# Patient Record
Sex: MALE | Race: WHITE
[De-identification: names, ages, dates, MRNs, and addresses within clinical notes are randomized per-mention and may not be internally consistent; named-entity substitution may affect disease eponyms.]

---

## 2020-03-22 ENCOUNTER — HOSPITAL ENCOUNTER (EMERGENCY)
Dept: HOSPITAL 94 - ER | Age: 30
Discharge: HOME | End: 2020-03-22
Payer: MEDICAID

## 2020-03-22 VITALS — DIASTOLIC BLOOD PRESSURE: 92 MMHG | SYSTOLIC BLOOD PRESSURE: 131 MMHG

## 2020-03-22 VITALS — BODY MASS INDEX: 20.31 KG/M2 | HEIGHT: 72 IN | WEIGHT: 149.91 LBS

## 2020-03-22 DIAGNOSIS — F17.200: ICD-10-CM

## 2020-03-22 DIAGNOSIS — F12.90: ICD-10-CM

## 2020-03-22 DIAGNOSIS — L03.116: Primary | ICD-10-CM

## 2020-03-22 DIAGNOSIS — F15.90: ICD-10-CM

## 2020-03-22 DIAGNOSIS — Z87.442: ICD-10-CM

## 2020-03-22 PROCEDURE — 99283 EMERGENCY DEPT VISIT LOW MDM: CPT

## 2020-04-20 ENCOUNTER — HOSPITAL ENCOUNTER (EMERGENCY)
Dept: HOSPITAL 94 - ER | Age: 30
Discharge: HOME | End: 2020-04-20
Payer: MEDICAID

## 2020-04-20 VITALS — BODY MASS INDEX: 19.92 KG/M2 | WEIGHT: 150.31 LBS | HEIGHT: 73 IN

## 2020-04-20 VITALS — DIASTOLIC BLOOD PRESSURE: 87 MMHG | SYSTOLIC BLOOD PRESSURE: 132 MMHG

## 2020-04-20 DIAGNOSIS — F12.90: ICD-10-CM

## 2020-04-20 DIAGNOSIS — K04.7: Primary | ICD-10-CM

## 2020-04-20 DIAGNOSIS — Z79.899: ICD-10-CM

## 2020-04-20 DIAGNOSIS — F17.200: ICD-10-CM

## 2020-04-20 DIAGNOSIS — F15.90: ICD-10-CM

## 2020-04-20 DIAGNOSIS — Z87.442: ICD-10-CM

## 2020-04-20 PROCEDURE — 99283 EMERGENCY DEPT VISIT LOW MDM: CPT

## 2020-06-08 ENCOUNTER — HOSPITAL ENCOUNTER (EMERGENCY)
Dept: HOSPITAL 94 - ER | Age: 30
Discharge: HOME | End: 2020-06-08
Payer: MEDICAID

## 2020-06-08 VITALS — HEIGHT: 72 IN | WEIGHT: 143.3 LBS | BODY MASS INDEX: 19.41 KG/M2

## 2020-06-08 VITALS — SYSTOLIC BLOOD PRESSURE: 134 MMHG | DIASTOLIC BLOOD PRESSURE: 88 MMHG

## 2020-06-08 DIAGNOSIS — F15.90: ICD-10-CM

## 2020-06-08 DIAGNOSIS — F12.90: ICD-10-CM
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
## 2020-06-08 DIAGNOSIS — F17.200: ICD-10-CM

## 2020-06-08 DIAGNOSIS — Z20.2: ICD-10-CM
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
## 2020-06-08 DIAGNOSIS — S30.812A: Primary | ICD-10-CM
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
## 2020-06-08 DIAGNOSIS — Z79.899: ICD-10-CM

## 2020-06-08 PROCEDURE — 87491 CHLMYD TRACH DNA AMP PROBE: CPT

## 2020-06-08 PROCEDURE — 86592 SYPHILIS TEST NON-TREP QUAL: CPT

## 2020-06-08 PROCEDURE — 99283 EMERGENCY DEPT VISIT LOW MDM: CPT

## 2020-06-08 PROCEDURE — 36415 COLL VENOUS BLD VENIPUNCTURE: CPT

## 2020-06-08 PROCEDURE — 87591 N.GONORRHOEAE DNA AMP PROB: CPT

## 2020-06-08 PROCEDURE — 96372 THER/PROPH/DIAG INJ SC/IM: CPT

## 2020-06-16 NOTE — NUR
letter being sent.  pt. has syphilis and was not given penicillin.  pt. needs 
to return to the ER and receive pcn for this disease.

## 2020-07-06 ENCOUNTER — HOSPITAL ENCOUNTER (EMERGENCY)
Dept: HOSPITAL 94 - ER | Age: 30
Discharge: HOME | End: 2020-07-06
Payer: MEDICAID

## 2020-07-06 VITALS — SYSTOLIC BLOOD PRESSURE: 114 MMHG | DIASTOLIC BLOOD PRESSURE: 78 MMHG

## 2020-07-06 VITALS — WEIGHT: 130.51 LBS | HEIGHT: 71 IN | BODY MASS INDEX: 18.27 KG/M2

## 2020-07-06 DIAGNOSIS — Z79.899: ICD-10-CM

## 2020-07-06 DIAGNOSIS — R10.9: ICD-10-CM

## 2020-07-06 DIAGNOSIS — R51: ICD-10-CM

## 2020-07-06 DIAGNOSIS — Z87.442: ICD-10-CM
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
## 2020-07-06 DIAGNOSIS — F15.90: ICD-10-CM

## 2020-07-06 DIAGNOSIS — S22.32XA: Primary | ICD-10-CM

## 2020-07-06 DIAGNOSIS — R05: ICD-10-CM
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
## 2020-07-06 DIAGNOSIS — F12.90: ICD-10-CM

## 2020-07-06 DIAGNOSIS — H53.8: ICD-10-CM

## 2020-07-06 PROCEDURE — 99285 EMERGENCY DEPT VISIT HI MDM: CPT

## 2020-07-06 PROCEDURE — 70486 CT MAXILLOFACIAL W/O DYE: CPT

## 2020-07-06 PROCEDURE — 72125 CT NECK SPINE W/O DYE: CPT

## 2020-07-06 PROCEDURE — 90471 IMMUNIZATION ADMIN: CPT

## 2020-07-06 PROCEDURE — 71250 CT THORAX DX C-: CPT

## 2020-07-06 PROCEDURE — 70450 CT HEAD/BRAIN W/O DYE: CPT

## 2020-07-06 PROCEDURE — 90715 TDAP VACCINE 7 YRS/> IM: CPT

## 2020-07-14 ENCOUNTER — HOSPITAL ENCOUNTER (EMERGENCY)
Dept: HOSPITAL 94 - ER | Age: 30
Discharge: HOME | End: 2020-07-14
Payer: MEDICAID

## 2020-07-14 VITALS — BODY MASS INDEX: 19.17 KG/M2 | HEIGHT: 71 IN | WEIGHT: 136.91 LBS

## 2020-07-14 VITALS — DIASTOLIC BLOOD PRESSURE: 93 MMHG | SYSTOLIC BLOOD PRESSURE: 127 MMHG

## 2020-07-14 DIAGNOSIS — Z87.442: ICD-10-CM

## 2020-07-14 DIAGNOSIS — Z79.2: ICD-10-CM

## 2020-07-14 DIAGNOSIS — Z79.899: ICD-10-CM

## 2020-07-14 DIAGNOSIS — M79.89: ICD-10-CM

## 2020-07-14 DIAGNOSIS — F12.90: ICD-10-CM

## 2020-07-14 DIAGNOSIS — F15.90: ICD-10-CM

## 2020-07-14 DIAGNOSIS — M79.631: Primary | ICD-10-CM

## 2020-07-14 PROCEDURE — 96372 THER/PROPH/DIAG INJ SC/IM: CPT

## 2020-07-14 PROCEDURE — 99284 EMERGENCY DEPT VISIT MOD MDM: CPT

## 2020-09-12 ENCOUNTER — HOSPITAL ENCOUNTER (EMERGENCY)
Dept: HOSPITAL 94 - ER | Age: 30
Discharge: HOME | End: 2020-09-12
Payer: MEDICAID

## 2020-09-12 VITALS — BODY MASS INDEX: 19.41 KG/M2 | HEIGHT: 72 IN | WEIGHT: 143.3 LBS

## 2020-09-12 VITALS — DIASTOLIC BLOOD PRESSURE: 84 MMHG | SYSTOLIC BLOOD PRESSURE: 122 MMHG

## 2020-09-12 DIAGNOSIS — M79.644: ICD-10-CM

## 2020-09-12 DIAGNOSIS — F17.200: ICD-10-CM

## 2020-09-12 DIAGNOSIS — Z87.442: ICD-10-CM

## 2020-09-12 DIAGNOSIS — F15.90: ICD-10-CM
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
## 2020-09-12 DIAGNOSIS — M79.89: ICD-10-CM

## 2020-09-12 DIAGNOSIS — Z79.2: ICD-10-CM

## 2020-09-12 DIAGNOSIS — F12.90: ICD-10-CM
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
## 2020-09-12 DIAGNOSIS — S60.111A: Primary | ICD-10-CM
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
## 2020-09-12 DIAGNOSIS — Z79.899: ICD-10-CM

## 2020-09-12 DIAGNOSIS — R58: ICD-10-CM

## 2020-09-12 PROCEDURE — 96372 THER/PROPH/DIAG INJ SC/IM: CPT

## 2020-09-12 PROCEDURE — 99284 EMERGENCY DEPT VISIT MOD MDM: CPT

## 2021-02-11 ENCOUNTER — HOSPITAL ENCOUNTER (EMERGENCY)
Dept: HOSPITAL 94 - ER | Age: 31
Discharge: HOME | End: 2021-02-11
Payer: MEDICAID

## 2021-02-11 VITALS — BODY MASS INDEX: 21.04 KG/M2 | WEIGHT: 150.31 LBS | HEIGHT: 71 IN

## 2021-02-11 VITALS — DIASTOLIC BLOOD PRESSURE: 78 MMHG | SYSTOLIC BLOOD PRESSURE: 126 MMHG

## 2021-02-11 DIAGNOSIS — M25.552: Primary | ICD-10-CM

## 2021-02-11 DIAGNOSIS — Z87.442: ICD-10-CM

## 2021-02-11 DIAGNOSIS — Z79.2: ICD-10-CM

## 2021-02-11 DIAGNOSIS — F15.90: ICD-10-CM

## 2021-02-11 DIAGNOSIS — Z79.899: ICD-10-CM

## 2021-02-11 DIAGNOSIS — F12.90: ICD-10-CM

## 2021-02-11 PROCEDURE — 73502 X-RAY EXAM HIP UNI 2-3 VIEWS: CPT

## 2021-02-11 PROCEDURE — 99283 EMERGENCY DEPT VISIT LOW MDM: CPT

## 2021-02-14 ENCOUNTER — HOSPITAL ENCOUNTER (EMERGENCY)
Dept: HOSPITAL 94 - ER | Age: 31
Discharge: HOME | End: 2021-02-14
Payer: MEDICAID

## 2021-02-14 VITALS — WEIGHT: 140.3 LBS | HEIGHT: 71 IN | BODY MASS INDEX: 19.64 KG/M2

## 2021-02-14 VITALS — DIASTOLIC BLOOD PRESSURE: 68 MMHG | SYSTOLIC BLOOD PRESSURE: 119 MMHG

## 2021-02-14 DIAGNOSIS — Z79.2: ICD-10-CM

## 2021-02-14 DIAGNOSIS — Z79.899: ICD-10-CM

## 2021-02-14 DIAGNOSIS — Z87.440: ICD-10-CM

## 2021-02-14 DIAGNOSIS — F12.90: ICD-10-CM

## 2021-02-14 DIAGNOSIS — F15.90: ICD-10-CM

## 2021-02-14 DIAGNOSIS — L02.31: Primary | ICD-10-CM

## 2021-02-14 PROCEDURE — 10061 I&D ABSCESS COMP/MULTIPLE: CPT

## 2021-02-14 PROCEDURE — 90715 TDAP VACCINE 7 YRS/> IM: CPT

## 2021-02-14 PROCEDURE — 99283 EMERGENCY DEPT VISIT LOW MDM: CPT

## 2021-02-14 PROCEDURE — 93005 ELECTROCARDIOGRAM TRACING: CPT

## 2021-07-12 ENCOUNTER — HOSPITAL ENCOUNTER (EMERGENCY)
Dept: HOSPITAL 94 - ER | Age: 31
Discharge: LEFT BEFORE DISCHARGE | End: 2021-07-12
Payer: MEDICAID

## 2021-07-12 VITALS — BODY MASS INDEX: 20.36 KG/M2 | WEIGHT: 150.31 LBS | HEIGHT: 72 IN

## 2021-07-12 VITALS — SYSTOLIC BLOOD PRESSURE: 119 MMHG | DIASTOLIC BLOOD PRESSURE: 82 MMHG

## 2021-07-12 DIAGNOSIS — M79.644: Primary | ICD-10-CM

## 2021-07-12 DIAGNOSIS — Z53.21: ICD-10-CM

## 2021-07-20 ENCOUNTER — HOSPITAL ENCOUNTER (EMERGENCY)
Dept: HOSPITAL 94 - ER | Age: 31
Discharge: LEFT BEFORE DISCHARGE | End: 2021-07-20
Payer: MEDICAID

## 2021-07-20 DIAGNOSIS — Z00.00: Primary | ICD-10-CM

## 2021-07-20 DIAGNOSIS — Z53.21: ICD-10-CM

## 2021-08-21 ENCOUNTER — HOSPITAL ENCOUNTER (EMERGENCY)
Dept: HOSPITAL 94 - ER | Age: 31
Discharge: LEFT BEFORE DISCHARGE | End: 2021-08-21
Payer: MEDICAID

## 2021-08-21 DIAGNOSIS — Z53.21: Primary | ICD-10-CM

## 2021-11-04 ENCOUNTER — HOSPITAL ENCOUNTER (EMERGENCY)
Dept: HOSPITAL 94 - ER | Age: 31
LOS: 1 days | Discharge: LEFT BEFORE DISCHARGE | End: 2021-11-05
Payer: MEDICAID

## 2021-11-04 VITALS — WEIGHT: 145.31 LBS | BODY MASS INDEX: 20.34 KG/M2 | HEIGHT: 71 IN

## 2021-11-04 VITALS — DIASTOLIC BLOOD PRESSURE: 83 MMHG | SYSTOLIC BLOOD PRESSURE: 126 MMHG

## 2021-11-04 DIAGNOSIS — L02.415: Primary | ICD-10-CM

## 2021-11-04 DIAGNOSIS — Z53.21: ICD-10-CM

## 2022-03-27 ENCOUNTER — HOSPITAL ENCOUNTER (EMERGENCY)
Dept: HOSPITAL 94 - ER | Age: 32
Discharge: HOME | End: 2022-03-27
Payer: MEDICAID

## 2022-03-27 VITALS — SYSTOLIC BLOOD PRESSURE: 130 MMHG | DIASTOLIC BLOOD PRESSURE: 94 MMHG

## 2022-03-27 VITALS — BODY MASS INDEX: 20.36 KG/M2 | HEIGHT: 72 IN | WEIGHT: 150.31 LBS

## 2022-03-27 DIAGNOSIS — F12.90: ICD-10-CM

## 2022-03-27 DIAGNOSIS — F15.90: ICD-10-CM
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
## 2022-03-27 DIAGNOSIS — Z79.899: ICD-10-CM

## 2022-03-27 DIAGNOSIS — T40.711A: Primary | ICD-10-CM

## 2022-03-27 PROCEDURE — 99283 EMERGENCY DEPT VISIT LOW MDM: CPT
